# Patient Record
Sex: FEMALE
[De-identification: names, ages, dates, MRNs, and addresses within clinical notes are randomized per-mention and may not be internally consistent; named-entity substitution may affect disease eponyms.]

---

## 2017-04-06 ENCOUNTER — APPOINTMENT (OUTPATIENT)
Dept: PEDIATRICS | Facility: CLINIC | Age: 32
End: 2017-04-06

## 2017-04-06 DIAGNOSIS — Z83.2 FAMILY HISTORY OF DISEASES OF THE BLOOD AND BLOOD-FORMING ORGANS AND CERTAIN DISORDERS INVOLVING THE IMMUNE MECHANISM: ICD-10-CM

## 2017-04-06 DIAGNOSIS — Z82.0 FAMILY HISTORY OF EPILEPSY AND OTHER DISEASES OF THE NERVOUS SYSTEM: ICD-10-CM

## 2017-04-06 DIAGNOSIS — Z82.69 FAMILY HISTORY OF OTHER DISEASES OF THE MUSCULOSKELETAL SYSTEM AND CONNECTIVE TISSUE: ICD-10-CM

## 2017-04-06 PROBLEM — Z00.00 ENCOUNTER FOR PREVENTIVE HEALTH EXAMINATION: Status: ACTIVE | Noted: 2017-04-06

## 2017-04-10 PROBLEM — Z82.69 FAMILY HISTORY OF SCOLIOSIS: Status: ACTIVE | Noted: 2017-04-10

## 2017-04-10 PROBLEM — Z82.0 FAMILY HISTORY OF MIGRAINE HEADACHES: Status: ACTIVE | Noted: 2017-04-10

## 2017-04-10 PROBLEM — Z83.2 FAMILY HISTORY OF BLEEDING OR CLOTTING DISORDER: Status: ACTIVE | Noted: 2017-04-10

## 2017-05-17 ENCOUNTER — APPOINTMENT (OUTPATIENT)
Dept: PEDIATRIC HEMATOLOGY/ONCOLOGY | Facility: CLINIC | Age: 32
End: 2017-05-17

## 2017-05-17 DIAGNOSIS — Z33.1 PREGNANT STATE, INCIDENTAL: ICD-10-CM

## 2017-06-12 ENCOUNTER — APPOINTMENT (OUTPATIENT)
Dept: PEDIATRIC HEMATOLOGY/ONCOLOGY | Facility: CLINIC | Age: 32
End: 2017-06-12

## 2018-01-08 ENCOUNTER — APPOINTMENT (OUTPATIENT)
Dept: HEART AND VASCULAR | Facility: CLINIC | Age: 33
End: 2018-01-08
Payer: MEDICAID

## 2018-01-08 ENCOUNTER — APPOINTMENT (OUTPATIENT)
Dept: PEDIATRIC HEMATOLOGY/ONCOLOGY | Facility: CLINIC | Age: 33
End: 2018-01-08
Payer: MEDICAID

## 2018-01-08 DIAGNOSIS — R93.8 ABNORMAL FINDINGS ON DIAGNOSTIC IMAGING OF OTHER SPECIFIED BODY STRUCTURES: ICD-10-CM

## 2018-01-08 PROCEDURE — 99215 OFFICE O/P EST HI 40 MIN: CPT

## 2018-01-08 PROCEDURE — 99204 OFFICE O/P NEW MOD 45 MIN: CPT

## 2018-01-09 RX ORDER — VITAMIN A, ASCORBIC ACID, CHOLECALCIFEROL, TOCOPHEROL, THIAMINE MONONITRATE, RIBOFLAVIN, PYRIDOXINE, FOLIC ACID, CYANOCOBALAMIN, CALCIUM CARBONATE, FERROUS FUMARATE, ZINC OXIDE, CUPRIC OXIDE, NIACINAMIDE, AND FISH OIL 27-1-250MG
KIT ORAL
Refills: 0 | Status: ACTIVE | COMMUNITY

## 2018-02-16 VITALS
DIASTOLIC BLOOD PRESSURE: 77 MMHG | HEIGHT: 66 IN | TEMPERATURE: 98 F | OXYGEN SATURATION: 96 % | HEART RATE: 75 BPM | WEIGHT: 154.98 LBS | RESPIRATION RATE: 18 BRPM | SYSTOLIC BLOOD PRESSURE: 118 MMHG

## 2018-02-16 RX ORDER — CHLORHEXIDINE GLUCONATE 213 G/1000ML
1 SOLUTION TOPICAL ONCE
Qty: 0 | Refills: 0 | Status: DISCONTINUED | OUTPATIENT
Start: 2018-02-20 | End: 2018-02-21

## 2018-02-16 NOTE — H&P ADULT - HISTORY OF PRESENT ILLNESS
**SKELETON    32 yr old F with PMHx of hereditary hemorrhagic telangiectasia syndrome (diagnosed during her recent pregnancy, patient son now 3 months old), pulmonary AVMs, brain AVM who presented to Dr. Gomez for evaluation. Patient does admit to severe migraines since adolescence and they have became much more frequent and severe since her pregnancy. Patient states her migraines are preceded by SOB. Patient denies any history of epistaxis or other bleeding abnormalities. MRI of chest demonstrated at least 5 pulmonary AVMs in both lungs. The largest is in the left lower lobe and is quite significant measuring 3 x 1.9cm. (as per Dr. Gomez's note). Multiple pulmonary function tests have been performed by pulmonologist Dr. Cruz and her oxygenation has been shown to be low during exertion in the studies.     Patient now presents for recommended pulmonary angiogram/embolization under general anesthesia. 32 yr old F with PMHx of hereditary hemorrhagic telangiectasia syndrome (diagnosed during her recent pregnancy, patient son now 4 months old), pulmonary AVMs, brain AVM (0.5cm) who presented to Dr. Gomez for evaluation. Patient does admit to severe migraines since adolescence and they have became much more frequent and severe since her pregnancy. Patient states her migraines are preceded by SOB. Patient denies any history of epistaxis or other bleeding abnormalities. MRI of chest 1/2018 demonstrated at least 5 pulmonary AVMs in both lungs. The largest is in the left lower lobe and is quite significant measuring 3 x 1.9cm. (as per Dr. Gomez's note). Multiple pulmonary function tests have been performed by pulmonologist Dr. Cruz and her oxygenation has been shown to be low during exertion in the studies.     Patient now presents for recommended pulmonary angiogram/embolization under general anesthesia. 32 yr old F with PMHx of hereditary hemorrhagic telangiectasia syndrome (diagnosed during her recent pregnancy, patient son now 4 months old), pulmonary AVMs, brain AVM (0.5cm) who presented to Dr. Gomez for evaluation. Patient does admit to severe migraines since adolescence and they have became much more frequent and severe since her pregnancy. Patient states her migraines are preceded by SOB. Patient denies any history of epistaxis or other bleeding abnormalities. MRI of chest 1/2018 demonstrated at least 5 pulmonary AVMs in both lungs. The largest is in the left lower lobe and is quite significant measuring 3 x 1.9cm. (as per Dr. Gomez's note). Multiple pulmonary function tests have been performed by pulmonologist Dr. Cruz and her oxygenation has been shown to be low during exertion in the studies.     Patient now presents for recommended pulmonary angiogram/embolization under general anesthesia.    **Of note: Patient would like her father Dr. Jhonatan Garcia to be contacted in case of any emergencies, [phone].

## 2018-02-16 NOTE — H&P ADULT - ASSESSMENT
32 yr old F with PMHx of hereditary hemorrhagic telangiectasia syndrome, pulmonary AVMs, who now presents for recommended pulmonary angiogram/embolization under general anesthesia.      ASA III        Mallampati II       Risks & benefits of procedure and alternative therapy have been explained to the patient including but not limited to: allergic reaction, bleeding w/possible need for blood transfusion, infection, renal and vascular compromise, limb damage, emergent surgery. Informed consent obtained and in chart.

## 2018-02-20 ENCOUNTER — INPATIENT (INPATIENT)
Facility: HOSPITAL | Age: 33
LOS: 0 days | Discharge: HOME | DRG: 254 | End: 2018-02-21
Attending: RADIOLOGY | Admitting: RADIOLOGY
Payer: MEDICAID

## 2018-02-20 DIAGNOSIS — Q27.30 ARTERIOVENOUS MALFORMATION, SITE UNSPECIFIED: ICD-10-CM

## 2018-02-20 LAB
ANION GAP SERPL CALC-SCNC: 14 MMOL/L — SIGNIFICANT CHANGE UP (ref 5–17)
APTT BLD: 29.6 SEC — SIGNIFICANT CHANGE UP (ref 27.5–37.4)
BASOPHILS NFR BLD AUTO: 0.6 % — SIGNIFICANT CHANGE UP (ref 0–2)
BUN SERPL-MCNC: 10 MG/DL — SIGNIFICANT CHANGE UP (ref 7–23)
CALCIUM SERPL-MCNC: 9.4 MG/DL — SIGNIFICANT CHANGE UP (ref 8.4–10.5)
CHLORIDE SERPL-SCNC: 101 MMOL/L — SIGNIFICANT CHANGE UP (ref 96–108)
CO2 SERPL-SCNC: 24 MMOL/L — SIGNIFICANT CHANGE UP (ref 22–31)
CREAT SERPL-MCNC: 0.56 MG/DL — SIGNIFICANT CHANGE UP (ref 0.5–1.3)
EOSINOPHIL NFR BLD AUTO: 1.7 % — SIGNIFICANT CHANGE UP (ref 0–6)
GLUCOSE SERPL-MCNC: 92 MG/DL — SIGNIFICANT CHANGE UP (ref 70–99)
HCG SERPL-ACNC: <.1 MIU/ML — SIGNIFICANT CHANGE UP
HCT VFR BLD CALC: 42.9 % — SIGNIFICANT CHANGE UP (ref 34.5–45)
HGB BLD-MCNC: 14.7 G/DL — SIGNIFICANT CHANGE UP (ref 11.5–15.5)
INR BLD: 1.19 — HIGH (ref 0.88–1.16)
LYMPHOCYTES # BLD AUTO: 40.3 % — SIGNIFICANT CHANGE UP (ref 13–44)
MCHC RBC-ENTMCNC: 29.9 PG — SIGNIFICANT CHANGE UP (ref 27–34)
MCHC RBC-ENTMCNC: 34.3 G/DL — SIGNIFICANT CHANGE UP (ref 32–36)
MCV RBC AUTO: 87.2 FL — SIGNIFICANT CHANGE UP (ref 80–100)
MONOCYTES NFR BLD AUTO: 7.2 % — SIGNIFICANT CHANGE UP (ref 2–14)
NEUTROPHILS NFR BLD AUTO: 50.2 % — SIGNIFICANT CHANGE UP (ref 43–77)
PLATELET # BLD AUTO: 179 K/UL — SIGNIFICANT CHANGE UP (ref 150–400)
POTASSIUM SERPL-MCNC: 4.2 MMOL/L — SIGNIFICANT CHANGE UP (ref 3.5–5.3)
POTASSIUM SERPL-SCNC: 4.2 MMOL/L — SIGNIFICANT CHANGE UP (ref 3.5–5.3)
PROTHROM AB SERPL-ACNC: 13.3 SEC — HIGH (ref 9.8–12.7)
RBC # BLD: 4.92 M/UL — SIGNIFICANT CHANGE UP (ref 3.8–5.2)
RBC # FLD: 13.8 % — SIGNIFICANT CHANGE UP (ref 10.3–16.9)
SODIUM SERPL-SCNC: 139 MMOL/L — SIGNIFICANT CHANGE UP (ref 135–145)
WBC # BLD: 5.4 K/UL — SIGNIFICANT CHANGE UP (ref 3.8–10.5)
WBC # FLD AUTO: 5.4 K/UL — SIGNIFICANT CHANGE UP (ref 3.8–10.5)

## 2018-02-20 PROCEDURE — 93010 ELECTROCARDIOGRAM REPORT: CPT

## 2018-02-20 PROCEDURE — 75743 ARTERY X-RAYS LUNGS: CPT | Mod: 26

## 2018-02-20 PROCEDURE — 36014 PLACE CATHETER IN ARTERY: CPT

## 2018-02-20 PROCEDURE — 75741 ARTERY X-RAYS LUNG: CPT | Mod: 26,59

## 2018-02-20 PROCEDURE — 36013 PLACE CATHETER IN ARTERY: CPT | Mod: 59

## 2018-02-20 RX ORDER — SODIUM CHLORIDE 9 MG/ML
1000 INJECTION INTRAMUSCULAR; INTRAVENOUS; SUBCUTANEOUS
Qty: 0 | Refills: 0 | Status: DISCONTINUED | OUTPATIENT
Start: 2018-02-20 | End: 2018-02-21

## 2018-02-20 RX ORDER — ONDANSETRON 8 MG/1
4 TABLET, FILM COATED ORAL EVERY 4 HOURS
Qty: 0 | Refills: 0 | Status: DISCONTINUED | OUTPATIENT
Start: 2018-02-20 | End: 2018-02-21

## 2018-02-20 RX ORDER — MORPHINE SULFATE 50 MG/1
4 CAPSULE, EXTENDED RELEASE ORAL EVERY 4 HOURS
Qty: 0 | Refills: 0 | Status: DISCONTINUED | OUTPATIENT
Start: 2018-02-20 | End: 2018-02-21

## 2018-02-20 RX ORDER — SODIUM CHLORIDE 9 MG/ML
500 INJECTION INTRAMUSCULAR; INTRAVENOUS; SUBCUTANEOUS
Qty: 0 | Refills: 0 | Status: DISCONTINUED | OUTPATIENT
Start: 2018-02-20 | End: 2018-02-21

## 2018-02-20 RX ORDER — CEFAZOLIN SODIUM 1 G
1000 VIAL (EA) INJECTION EVERY 8 HOURS
Qty: 0 | Refills: 0 | Status: DISCONTINUED | OUTPATIENT
Start: 2018-02-20 | End: 2018-02-21

## 2018-02-20 RX ADMIN — SODIUM CHLORIDE 75 MILLILITER(S): 9 INJECTION INTRAMUSCULAR; INTRAVENOUS; SUBCUTANEOUS at 22:47

## 2018-02-20 RX ADMIN — Medication 100 MILLIGRAM(S): at 22:47

## 2018-02-21 ENCOUNTER — TRANSCRIPTION ENCOUNTER (OUTPATIENT)
Age: 33
End: 2018-02-21

## 2018-02-21 VITALS — TEMPERATURE: 97 F

## 2018-02-21 LAB
ANION GAP SERPL CALC-SCNC: 12 MMOL/L — SIGNIFICANT CHANGE UP (ref 5–17)
BUN SERPL-MCNC: 11 MG/DL — SIGNIFICANT CHANGE UP (ref 7–23)
CALCIUM SERPL-MCNC: 9.3 MG/DL — SIGNIFICANT CHANGE UP (ref 8.4–10.5)
CHLORIDE SERPL-SCNC: 105 MMOL/L — SIGNIFICANT CHANGE UP (ref 96–108)
CO2 SERPL-SCNC: 23 MMOL/L — SIGNIFICANT CHANGE UP (ref 22–31)
CREAT SERPL-MCNC: 0.62 MG/DL — SIGNIFICANT CHANGE UP (ref 0.5–1.3)
GLUCOSE SERPL-MCNC: 100 MG/DL — HIGH (ref 70–99)
HCT VFR BLD CALC: 41 % — SIGNIFICANT CHANGE UP (ref 34.5–45)
HGB BLD-MCNC: 13.7 G/DL — SIGNIFICANT CHANGE UP (ref 11.5–15.5)
MCHC RBC-ENTMCNC: 29.1 PG — SIGNIFICANT CHANGE UP (ref 27–34)
MCHC RBC-ENTMCNC: 33.4 G/DL — SIGNIFICANT CHANGE UP (ref 32–36)
MCV RBC AUTO: 87 FL — SIGNIFICANT CHANGE UP (ref 80–100)
PLATELET # BLD AUTO: 172 K/UL — SIGNIFICANT CHANGE UP (ref 150–400)
POTASSIUM SERPL-MCNC: 4.4 MMOL/L — SIGNIFICANT CHANGE UP (ref 3.5–5.3)
POTASSIUM SERPL-SCNC: 4.4 MMOL/L — SIGNIFICANT CHANGE UP (ref 3.5–5.3)
RBC # BLD: 4.71 M/UL — SIGNIFICANT CHANGE UP (ref 3.8–5.2)
RBC # FLD: 13.7 % — SIGNIFICANT CHANGE UP (ref 10.3–16.9)
SODIUM SERPL-SCNC: 140 MMOL/L — SIGNIFICANT CHANGE UP (ref 135–145)
WBC # BLD: 7.9 K/UL — SIGNIFICANT CHANGE UP (ref 3.8–10.5)
WBC # FLD AUTO: 7.9 K/UL — SIGNIFICANT CHANGE UP (ref 3.8–10.5)

## 2018-02-21 RX ORDER — CEPHALEXIN 500 MG
1 CAPSULE ORAL
Qty: 28 | Refills: 0
Start: 2018-02-21 | End: 2018-02-27

## 2018-02-21 RX ADMIN — Medication 100 MILLIGRAM(S): at 06:23

## 2018-02-21 NOTE — DISCHARGE NOTE ADULT - ADDITIONAL INSTRUCTIONS
All of your medications have been prescribed to your pharmacy and please take as directed, do not stop unless directed by physician.  Please follow up with Dr. Gomez in 6 weeks.

## 2018-02-21 NOTE — DISCHARGE NOTE ADULT - CARE PROVIDER_API CALL
David Gomez (MD), Diagnostic Radiology  [address]  [address] [place] [address]  Phone: [phone]  Fax: [phone]

## 2018-02-21 NOTE — DISCHARGE NOTE ADULT - MEDICATION SUMMARY - MEDICATIONS TO TAKE
I will START or STAY ON the medications listed below when I get home from the hospital:    Keflex 500 mg oral capsule  -- 1 cap(s) by mouth 4 times a day  -- Indication: For Prophylaxis for infection

## 2018-02-21 NOTE — DISCHARGE NOTE ADULT - PLAN OF CARE
You had a coil embolization of left pulmonary AVM You had a coil embolization of left pulmonary AVM. Please take antibiotics as prescribed and follow up with Dr. Gomez in 6 weeks - started on Keflex 500mg four times a day orally for one week  - follow up with Dr. Gomez in 6 weeks

## 2018-02-21 NOTE — DISCHARGE NOTE ADULT - INSTRUCTIONS
You underwent a coil emobolization of left pulmonary AVM and should wait 3 days before returning to ordinary activities. Do not drive for 2 days. Consult your doctor before returning to vigorous activity. You may return to work in 3-5 days. The catheter from your groin was removed and you should remove the dressing in 24 hours. You may shower once the dressing is removed, but avoid baths, hot tubs, or swimming for 5 days to prevent infection. If you notice bleeding from the site, hardening and pain at the site, drainage or redness from the site, coolness/paleness of the extremity, swelling, or fever, please call [phone]. Please continue your medications as prescribed unless otherwise indicated by your physician. All of your prescriptions have been sent to the pharmacy. Please follow up with Dr. Gomez in 6 weeks. All of your needed prescriptions have been sent

## 2018-02-21 NOTE — DISCHARGE NOTE ADULT - CARE PLAN
Principal Discharge DX:	AVM (arteriovenous malformation) Principal Discharge DX:	AVM (arteriovenous malformation)  Goal:	You had a coil embolization of left pulmonary AVM Principal Discharge DX:	AVM (arteriovenous malformation)  Goal:	You had a coil embolization of left pulmonary AVM. Please take antibiotics as prescribed and follow up with Dr. Gomez in 6 weeks  Assessment and plan of treatment:	- started on Keflex 500mg four times a day orally for one week  - follow up with Dr. Gomez in 6 weeks

## 2018-02-21 NOTE — DISCHARGE NOTE ADULT - PATIENT PORTAL LINK FT
You can access the [facility], offered by Health system, by registering with the following website: [email or web address]

## 2018-02-21 NOTE — DISCHARGE NOTE ADULT - HOSPITAL COURSE
32 yr old F with PMHx of hereditary hemorrhagic telangiectasia syndrome (diagnosed during her recent pregnancy, patient son now 4 months old), pulmonary AVMs, brain AVM (0.5cm) who presented to Dr. Gomez for evaluation. Patient does admit to severe migraines since adolescence and they have became much more frequent and severe since her pregnancy. Patient states her migraines are preceded by SOB. Patient denies any history of epistaxis or other bleeding abnormalities. MRI of chest 1/2018 demonstrated at least 5 pulmonary AVMs in both lungs. The largest is in the left lower lobe and is quite significant measuring 3 x 1.9cm. (as per Dr. Gomez's note). Multiple pulmonary function tests have been performed by pulmonologist Dr. Cruz and her oxygenation has been shown to be low during exertion in the studies. Patient now presents for recommended pulmonary angiogram/embolization under general anesthesia. Of note: Patient would like her father Dr. Jhonatan Garcia to be contacted in case of any emergencies, [phone]. 32 yr old F with PMHx of hereditary hemorrhagic telangiectasia syndrome (diagnosed during her recent pregnancy, patient son now 4 months old), pulmonary AVMs, brain AVM (0.5cm) who presented to Dr. Gomez for evaluation. Patient does admit to severe migraines since adolescence and they have became much more frequent and severe since her pregnancy. Patient states her migraines are preceded by SOB. Patient denies any history of epistaxis or other bleeding abnormalities. MRI of chest 1/2018 demonstrated at least 5 pulmonary AVMs in both lungs. The largest is in the left lower lobe and is quite significant measuring 3 x 1.9cm. (as per Dr. Gomez's note). Multiple pulmonary function tests have been performed by pulmonologist Dr. Cruz and her oxygenation has been shown to be low during exertion in the studies. Patient now presents for recommended pulmonary angiogram/embolization under general anesthesia. Of note: Patient would like her father Dr. Jhonatan Garcia to be contacted in case of any emergencies, [phone].     Pt is s/p coil embolization of Left  pulmonary AVM 02/20. Overnight, no events. This AM, feels well, denies pain at access site, CP, SOB, n/v/d, LE edema. VSS, no events on tele, labs unremarkable PE sig for R groin site stable c/d/i, no bleeding, swelling, hematoma. Patient was seen and examined by attending who agrees with above d/c plan. All meds rx to pharmacy and explained to patient. Patient instructed to return if sx worsen including not limited to chest pain, shortness of breath. 32 yr old F with PMHx of hereditary hemorrhagic telangiectasia syndrome (diagnosed during her recent pregnancy, patient son now 4 months old), pulmonary AVMs, brain AVM (0.5cm) who presented to Dr. Gomez for evaluation. Patient does admit to severe migraines since adolescence and they have became much more frequent and severe since her pregnancy. Patient states her migraines are preceded by SOB. Patient denies any history of epistaxis or other bleeding abnormalities. MRI of chest 1/2018 demonstrated at least 5 pulmonary AVMs in both lungs. The largest is in the left lower lobe and is quite significant measuring 3 x 1.9cm. (as per Dr. Gomez's note). Multiple pulmonary function tests have been performed by pulmonologist Dr. Cruz and her oxygenation has been shown to be low during exertion in the studies. Patient now presents for recommended pulmonary angiogram/embolization under general anesthesia. Of note: Patient would like her father Dr. Jhonatan Garcia to be contacted in case of any emergencies, [phone].     Pt is s/p coil embolization of Left  pulmonary AVM 02/20. Overnight, no events. This AM, feels well, denies pain at access site, CP, SOB, n/v/d, LE edema. VSS, no events on tele, labs unremarkable PE sig for R groin site stable c/d/i, no bleeding, swelling or hematoma. Patient was seen and examined by attending who agrees with above d/c plan. All meds rx to pharmacy and explained to patient. Patient instructed to return if sx worsen including not limited to chest pain, shortness of breath.

## 2018-03-05 DIAGNOSIS — Q25.72 CONGENITAL PULMONARY ARTERIOVENOUS MALFORMATION: ICD-10-CM

## 2018-03-05 DIAGNOSIS — I78.0 HEREDITARY HEMORRHAGIC TELANGIECTASIA: ICD-10-CM

## 2018-03-05 DIAGNOSIS — G43.909 MIGRAINE, UNSPECIFIED, NOT INTRACTABLE, WITHOUT STATUS MIGRAINOSUS: ICD-10-CM

## 2018-04-03 PROBLEM — Q27.30 ARTERIOVENOUS MALFORMATION, SITE UNSPECIFIED: Chronic | Status: ACTIVE | Noted: 2018-02-20

## 2018-04-09 ENCOUNTER — APPOINTMENT (OUTPATIENT)
Dept: HEART AND VASCULAR | Facility: CLINIC | Age: 33
End: 2018-04-09

## 2018-04-23 ENCOUNTER — APPOINTMENT (OUTPATIENT)
Dept: HEART AND VASCULAR | Facility: CLINIC | Age: 33
End: 2018-04-23
Payer: MEDICAID

## 2018-04-23 PROCEDURE — 99214 OFFICE O/P EST MOD 30 MIN: CPT

## 2018-05-23 PROCEDURE — 84702 CHORIONIC GONADOTROPIN TEST: CPT

## 2018-05-23 PROCEDURE — 93005 ELECTROCARDIOGRAM TRACING: CPT

## 2018-05-23 PROCEDURE — C1769: CPT

## 2018-05-23 PROCEDURE — C1889: CPT

## 2018-05-23 PROCEDURE — C1894: CPT

## 2018-05-23 PROCEDURE — 80048 BASIC METABOLIC PNL TOTAL CA: CPT

## 2018-05-23 PROCEDURE — C1887: CPT

## 2018-05-23 PROCEDURE — 85730 THROMBOPLASTIN TIME PARTIAL: CPT

## 2018-05-23 PROCEDURE — 85610 PROTHROMBIN TIME: CPT

## 2018-05-23 PROCEDURE — 85027 COMPLETE CBC AUTOMATED: CPT

## 2018-05-23 PROCEDURE — 85025 COMPLETE CBC W/AUTO DIFF WBC: CPT

## 2019-07-01 NOTE — PATIENT PROFILE ADULT. - TEACHING/LEARNING FACTORS IMPACT ABILITY TO LEARN
CONSTITUTIONAL: No fevers, no chills  Eyes: No vision changes  Cardiovascular: No Chest pain  Respiratory: No SOB none

## 2019-12-04 ENCOUNTER — APPOINTMENT (OUTPATIENT)
Dept: PEDIATRIC HEMATOLOGY/ONCOLOGY | Facility: CLINIC | Age: 34
End: 2019-12-04
Payer: MEDICAID

## 2019-12-04 VITALS — HEART RATE: 100 BPM

## 2019-12-04 DIAGNOSIS — Q25.72 CONGENITAL PULMONARY ARTERIOVENOUS MALFORMATION: ICD-10-CM

## 2019-12-04 DIAGNOSIS — I78.0 HEREDITARY HEMORRHAGIC TELANGIECTASIA: ICD-10-CM

## 2019-12-04 PROCEDURE — 99215 OFFICE O/P EST HI 40 MIN: CPT

## 2019-12-05 NOTE — ASSESSMENT
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 MRI/MRA of brain demonstrated small AVM in\par Pulmonary evaluation notable for resting oxygen saturation 97-98% with desaturation after 6 minute walk. MRI of chest scheduled. Son has had spinal AVM which bled, and he has neurologic deficits.  Last seen 01/08/2018. s/p embolization and follow-up angiogram by Dr. Damon for CNS AVMs which were small. s/p angiogram and embolization of chest AVM – he embolized the largest lesion and there are 2 small lesions. Still gets migraine like headaches 1-2 times per month with transient vision loss, emesis, photophobia, becomes constipated. Headaches and hard cheese, hot dogs, deli meats, generally trigger the headaches. Treats with cumin tablet. Not followed by a neurologist. No mouth bleeding. Seen by dentist 3-4 months ago. Dentist wants to have patient to take antibiotics prior to each visit – mother and Dr. Roslyn Gomez concerned about frequent use of antibiotics. Followed by Dr. Cruz (pulmonary) – O2 sat in 97% range. No respiratory issues. Uses copper IUD (non-hormonal) for birth control. Here to discuss monitoring plan, headaches, and  some questions about son who also has HHT. Also would like to discuss possibility of IVF pregnancy with pre-implantation genetic testing – risks of hormonal stimulation, etc.\par Medications: none\par Allergies: Penicillin - hives Nutrition: eating well, Kosher Elimination: normal, no gross blood in stool Sleep: normal Pain: none except when she has migraines\par O2 sat = 96=97%\par 					Normal	Abnormal findings and comments\par General appearance			alert, active, in no acute distress\par Mood and affect			cooperative\par Head						normal\par Eyes					wears spectacles\par Ears						normal\par Nose						normal\par Pharynx/buccal mucosa/throat	small pinpoint vascular spots on tongue, cheeks and mucosal portion of gums\par Neck						normal\par Chest					clear R&L, no stridor, rhonchi or wheezing\par Heart					S1S2, no murmur, RRR\par Abdomen					normal\par Extremities			some vascular streaking on fingertips and subungual\par Back					ND\par Skin				see above and photographs\par Neurologic					normal\par Pulses 						normal\par Photograph taken: yes\par Impression/Plan: Patient with Hereditary Hemorrhagic Telangiectasia with 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 Clinically stable. Discussed the following:\par 1. monitoring – abdominal ultrasound, MRI/MRA of chest – will schedule\par 2. Follow-up with Dr. Cruz\par 3. Dental – I will contact my colleagues who follow complex patients with dental issues and get back to mother. Questions to address – is Peridex (or similar) adequate for pre and post dental visits which are non-invasive or are oral antibiotics necessary? Liquid Amicar swish and spit to prevent bleeding – to have available. Any other suggestions.\par 4. I will discuss possible modified IVF protocol that we have used for women having vascular malformations, to limit exposure to hormonal surges.\par 5. Headaches – migraine like – advised to avoid aspirin, NSAIDs which can cause bleeding.\par 6. For son – mother will schedule an appointment for him and I can order MRI/MRA of head and chest, and abdominal ultrasound. Will refer for treatment/management of epistaxis. I will contact Dr. Sands for input re: orthopedist and brace, as the past 2 orthotics have caused irritation and pain.\par All questions answered.  Letter to Dr. Roslyn Gomez. Routine care with pediatrician.\par Follow-up: as above\par History, review of systems, physical examination. Coordination of care and/or counseling >50%. Reviewed prior photographs. Photograph, downloading, cropping, indexing, 10 minutes.\par Evelyn Aldridge MD    Date/Time:       12/4/19 10:15 AM         Signature:       \par [facility] [employer].\par [facility] [place] [facility].  We do participate with [employer].  You can have the family contact the office at .	[email or web address]

## 2019-12-05 NOTE — REASON FOR VISIT
[Follow-Up Visit] : a follow-up visit  [Patient] : patient [FreeTextEntry2] : management of hereditary hemorrhagic telangiectasia and discuss treatment/monitoring plan.

## 2020-01-24 ENCOUNTER — APPOINTMENT (OUTPATIENT)
Dept: HEART AND VASCULAR | Facility: CLINIC | Age: 35
End: 2020-01-24
Payer: MEDICAID

## 2020-01-24 DIAGNOSIS — R51 HEADACHE: ICD-10-CM

## 2020-01-24 DIAGNOSIS — I78.0 HEREDITARY HEMORRHAGIC TELANGIECTASIA: ICD-10-CM

## 2020-01-24 DIAGNOSIS — Q28.2 ARTERIOVENOUS MALFORMATION OF CEREBRAL VESSELS: ICD-10-CM

## 2020-01-24 PROCEDURE — 99214 OFFICE O/P EST MOD 30 MIN: CPT

## 2020-02-03 VITALS
HEART RATE: 86 BPM | RESPIRATION RATE: 16 BRPM | WEIGHT: 160.28 LBS | DIASTOLIC BLOOD PRESSURE: 85 MMHG | HEIGHT: 66 IN | OXYGEN SATURATION: 98 % | TEMPERATURE: 98 F | SYSTOLIC BLOOD PRESSURE: 106 MMHG

## 2020-02-03 RX ORDER — CHLORHEXIDINE GLUCONATE 213 G/1000ML
1 SOLUTION TOPICAL ONCE
Refills: 0 | Status: DISCONTINUED | OUTPATIENT
Start: 2020-02-04 | End: 2020-02-05

## 2020-02-03 NOTE — H&P ADULT - HISTORY OF PRESENT ILLNESS
SKELETON  Hx obtained for outpatient note.     Patient is a 34 year old female FHx of AVM (son spinal AVM with neurological deficits) with Hereditary Hemorrhagic Telangiectasia with c.360+5G>C (Intronic) Endoglin mutation. MRI/MRA of brain demonstrated small AVM in Pulmonary evaluation notable for resting oxygen saturation 97-98% with desaturation after 6 minute walk. MRI of chest scheduled.  Last embolization 01/08/2018. embolized the largest lesion and there are 2 small lesions. Followed by Dr. Cruz (pulmonary) – O2 sat in 97% range. No respiratory issues.     Presents for Pulmonary embolization under general anesthesia Patient is a 34 year old female FHx of AVM (son spinal AVM with neurological deficits) with Hereditary Hemorrhagic Telangiectasia with c.360+5G>C (Intronic) Endoglin mutation presented to her doctor c/o severe migraines and chest tightness when she is carrying something heavy or doing some physical activity.  Pt was referred for MRI/MRA of brain demonstrated small AVM in Pulmonary evaluation notable for resting oxygen saturation 97-98% with desaturation after 6 minute walk. MRA of chest was done, pt brought CD with her.  Last embolization 01/08/2018. embolized the largest lesion and there are 2 small lesions. Followed by Dr. Cruz (pulmonary) – O2 sat in 97% range. No respiratory issues. Pt denies SOB, palpitations, abdominal pain, dizziness, syncope, fever/chills, blood in the stool. Pt was given BB for her headaches but she stopped taking it on her own.     In light of pt's risk factors, symptoms mentioned above and abnormal MRA/MRI of the brain, pt presents for Pulmonary embolization under general anesthesia.

## 2020-02-03 NOTE — H&P ADULT - ASSESSMENT
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    ASA III and Mallampati III    OF NOTE:  pt's LMP on 1/31/2020, BHCG is negative

## 2020-02-04 ENCOUNTER — INPATIENT (INPATIENT)
Facility: HOSPITAL | Age: 35
LOS: 0 days | Discharge: HOME | DRG: 254 | End: 2020-02-05
Attending: RADIOLOGY | Admitting: RADIOLOGY
Payer: COMMERCIAL

## 2020-02-04 PROBLEM — I78.0 HHT (HEREDITARY HEMORRHAGIC TELANGIECTASIA): Status: ACTIVE | Noted: 2018-01-09

## 2020-02-04 PROBLEM — Q28.2 AVM (ARTERIOVENOUS MALFORMATION) BRAIN: Status: ACTIVE | Noted: 2017-05-17

## 2020-02-04 PROBLEM — R51 FREQUENT HEADACHES: Status: ACTIVE | Noted: 2017-04-06

## 2020-02-04 LAB
ALBUMIN SERPL ELPH-MCNC: 4.7 G/DL — SIGNIFICANT CHANGE UP (ref 3.3–5)
ALP SERPL-CCNC: 58 U/L — SIGNIFICANT CHANGE UP (ref 40–120)
ALT FLD-CCNC: 19 U/L — SIGNIFICANT CHANGE UP (ref 10–45)
ANION GAP SERPL CALC-SCNC: 12 MMOL/L — SIGNIFICANT CHANGE UP (ref 5–17)
APTT BLD: 29.9 SEC — SIGNIFICANT CHANGE UP (ref 27.5–36.3)
AST SERPL-CCNC: 20 U/L — SIGNIFICANT CHANGE UP (ref 10–40)
BASOPHILS # BLD AUTO: 0.04 K/UL — SIGNIFICANT CHANGE UP (ref 0–0.2)
BASOPHILS NFR BLD AUTO: 0.8 % — SIGNIFICANT CHANGE UP (ref 0–2)
BILIRUB SERPL-MCNC: 0.5 MG/DL — SIGNIFICANT CHANGE UP (ref 0.2–1.2)
BUN SERPL-MCNC: 12 MG/DL — SIGNIFICANT CHANGE UP (ref 7–23)
CALCIUM SERPL-MCNC: 9.4 MG/DL — SIGNIFICANT CHANGE UP (ref 8.4–10.5)
CHLORIDE SERPL-SCNC: 103 MMOL/L — SIGNIFICANT CHANGE UP (ref 96–108)
CO2 SERPL-SCNC: 26 MMOL/L — SIGNIFICANT CHANGE UP (ref 22–31)
CREAT SERPL-MCNC: 0.63 MG/DL — SIGNIFICANT CHANGE UP (ref 0.5–1.3)
EOSINOPHIL # BLD AUTO: 0.12 K/UL — SIGNIFICANT CHANGE UP (ref 0–0.5)
EOSINOPHIL NFR BLD AUTO: 2.5 % — SIGNIFICANT CHANGE UP (ref 0–6)
GLUCOSE SERPL-MCNC: 94 MG/DL — SIGNIFICANT CHANGE UP (ref 70–99)
HCG SERPL-ACNC: <0 MIU/ML — SIGNIFICANT CHANGE UP
HCT VFR BLD CALC: 46.1 % — HIGH (ref 34.5–45)
HGB BLD-MCNC: 15.2 G/DL — SIGNIFICANT CHANGE UP (ref 11.5–15.5)
IMM GRANULOCYTES NFR BLD AUTO: 0.2 % — SIGNIFICANT CHANGE UP (ref 0–1.5)
INR BLD: 1.2 — HIGH (ref 0.88–1.16)
LYMPHOCYTES # BLD AUTO: 1.53 K/UL — SIGNIFICANT CHANGE UP (ref 1–3.3)
LYMPHOCYTES # BLD AUTO: 32.2 % — SIGNIFICANT CHANGE UP (ref 13–44)
MCHC RBC-ENTMCNC: 30.5 PG — SIGNIFICANT CHANGE UP (ref 27–34)
MCHC RBC-ENTMCNC: 33 GM/DL — SIGNIFICANT CHANGE UP (ref 32–36)
MCV RBC AUTO: 92.4 FL — SIGNIFICANT CHANGE UP (ref 80–100)
MONOCYTES # BLD AUTO: 0.41 K/UL — SIGNIFICANT CHANGE UP (ref 0–0.9)
MONOCYTES NFR BLD AUTO: 8.6 % — SIGNIFICANT CHANGE UP (ref 2–14)
NEUTROPHILS # BLD AUTO: 2.64 K/UL — SIGNIFICANT CHANGE UP (ref 1.8–7.4)
NEUTROPHILS NFR BLD AUTO: 55.7 % — SIGNIFICANT CHANGE UP (ref 43–77)
NRBC # BLD: 0 /100 WBCS — SIGNIFICANT CHANGE UP (ref 0–0)
PLATELET # BLD AUTO: 177 K/UL — SIGNIFICANT CHANGE UP (ref 150–400)
POTASSIUM SERPL-MCNC: 4 MMOL/L — SIGNIFICANT CHANGE UP (ref 3.5–5.3)
POTASSIUM SERPL-SCNC: 4 MMOL/L — SIGNIFICANT CHANGE UP (ref 3.5–5.3)
PROT SERPL-MCNC: 7.3 G/DL — SIGNIFICANT CHANGE UP (ref 6–8.3)
PROTHROM AB SERPL-ACNC: 13.7 SEC — HIGH (ref 10–12.9)
RBC # BLD: 4.99 M/UL — SIGNIFICANT CHANGE UP (ref 3.8–5.2)
RBC # FLD: 12.6 % — SIGNIFICANT CHANGE UP (ref 10.3–14.5)
SODIUM SERPL-SCNC: 141 MMOL/L — SIGNIFICANT CHANGE UP (ref 135–145)
WBC # BLD: 4.75 K/UL — SIGNIFICANT CHANGE UP (ref 3.8–10.5)
WBC # FLD AUTO: 4.75 K/UL — SIGNIFICANT CHANGE UP (ref 3.8–10.5)

## 2020-02-04 PROCEDURE — 75898 FOLLOW-UP ANGIOGRAPHY: CPT | Mod: 26,59

## 2020-02-04 PROCEDURE — 37242 VASC EMBOLIZE/OCCLUDE ARTERY: CPT

## 2020-02-04 PROCEDURE — 36015 PLACE CATHETER IN ARTERY: CPT | Mod: LT

## 2020-02-04 PROCEDURE — 36014 PLACE CATHETER IN ARTERY: CPT | Mod: RT

## 2020-02-04 PROCEDURE — 93010 ELECTROCARDIOGRAM REPORT: CPT

## 2020-02-04 RX ORDER — MORPHINE SULFATE 50 MG/1
4 CAPSULE, EXTENDED RELEASE ORAL EVERY 4 HOURS
Refills: 0 | Status: DISCONTINUED | OUTPATIENT
Start: 2020-02-04 | End: 2020-02-05

## 2020-02-04 RX ORDER — CIPROFLOXACIN LACTATE 400MG/40ML
400 VIAL (ML) INTRAVENOUS EVERY 12 HOURS
Refills: 0 | Status: COMPLETED | OUTPATIENT
Start: 2020-02-04 | End: 2020-02-05

## 2020-02-04 RX ORDER — ONDANSETRON 8 MG/1
4 TABLET, FILM COATED ORAL EVERY 4 HOURS
Refills: 0 | Status: DISCONTINUED | OUTPATIENT
Start: 2020-02-04 | End: 2020-02-05

## 2020-02-04 RX ADMIN — Medication 200 MILLIGRAM(S): at 17:28

## 2020-02-04 NOTE — BRIEF OPERATIVE NOTE - NSICDXBRIEFPROCEDURE_GEN_ALL_CORE_FT
PROCEDURES:  Fluoroscopic guidance for percutaneous embolization of pulmonary artery 04-Feb-2020 10:41:06  Oneida Zavala

## 2020-02-04 NOTE — BRIEF OPERATIVE NOTE - OPERATION/FINDINGS
-L pulmonary angiogram shows residual flow through previously coiled BRENDA AVM  -further coil embolization of pulmonary AVM performed using two 3x4 Concerto coils

## 2020-02-04 NOTE — ASSESSMENT
[FreeTextEntry1] : This is a 34-year-old female with known multiple pulmonary AVMs status post embolization of a large lesion in the mid left lung 2 years ago. She was basically asymptomatic although she does have a history of migraines which may or may not be related to the AVMs. She does have known HHT and is also followed by Dr. Damon. She came with a new MRI which shows a significant sized AVM in the mid left lung. It is unclear whether this is a new lesion or whether there is a recanalization through the previously coiled lesion. There also multiple small pulmonary AVMs in both lungs. She will arrange to come in for pulmonary angiography and embolization of any significant lesions.

## 2020-02-04 NOTE — PHYSICAL EXAM
[Alert] : alert [No Acute Distress] : no acute distress [PERRL] : pupils equal, round and reactive to light [Normal Hearing] : hearing was normal [No Neck Mass] : no neck mass was observed [No Respiratory Distress] : no respiratory distress [Normal Rate] : heart rate was normal  [Regular Rhythm] : with a regular rhythm [No Edema] : there was no peripheral edema [Not Tender] : non-tender [Not Distended] : not distended [Normal Gait] : normal gait [No Rash] : no rash [No Skin Lesions] : no skin lesions [No Motor Deficits] : the motor exam was normal [Oriented x3] : oriented to person, place, and time

## 2020-02-05 ENCOUNTER — TRANSCRIPTION ENCOUNTER (OUTPATIENT)
Age: 35
End: 2020-02-05

## 2020-02-05 VITALS — TEMPERATURE: 98 F

## 2020-02-05 LAB
ANION GAP SERPL CALC-SCNC: 10 MMOL/L — SIGNIFICANT CHANGE UP (ref 5–17)
BUN SERPL-MCNC: 14 MG/DL — SIGNIFICANT CHANGE UP (ref 7–23)
CALCIUM SERPL-MCNC: 9 MG/DL — SIGNIFICANT CHANGE UP (ref 8.4–10.5)
CHLORIDE SERPL-SCNC: 105 MMOL/L — SIGNIFICANT CHANGE UP (ref 96–108)
CO2 SERPL-SCNC: 24 MMOL/L — SIGNIFICANT CHANGE UP (ref 22–31)
CREAT SERPL-MCNC: 0.67 MG/DL — SIGNIFICANT CHANGE UP (ref 0.5–1.3)
GLUCOSE SERPL-MCNC: 124 MG/DL — HIGH (ref 70–99)
HCT VFR BLD CALC: 40.8 % — SIGNIFICANT CHANGE UP (ref 34.5–45)
HGB BLD-MCNC: 13.6 G/DL — SIGNIFICANT CHANGE UP (ref 11.5–15.5)
MCHC RBC-ENTMCNC: 30.5 PG — SIGNIFICANT CHANGE UP (ref 27–34)
MCHC RBC-ENTMCNC: 33.3 GM/DL — SIGNIFICANT CHANGE UP (ref 32–36)
MCV RBC AUTO: 91.5 FL — SIGNIFICANT CHANGE UP (ref 80–100)
NRBC # BLD: 0 /100 WBCS — SIGNIFICANT CHANGE UP (ref 0–0)
PLATELET # BLD AUTO: 190 K/UL — SIGNIFICANT CHANGE UP (ref 150–400)
POTASSIUM SERPL-MCNC: 4.2 MMOL/L — SIGNIFICANT CHANGE UP (ref 3.5–5.3)
POTASSIUM SERPL-SCNC: 4.2 MMOL/L — SIGNIFICANT CHANGE UP (ref 3.5–5.3)
RBC # BLD: 4.46 M/UL — SIGNIFICANT CHANGE UP (ref 3.8–5.2)
RBC # FLD: 12.7 % — SIGNIFICANT CHANGE UP (ref 10.3–14.5)
SODIUM SERPL-SCNC: 139 MMOL/L — SIGNIFICANT CHANGE UP (ref 135–145)
WBC # BLD: 10.99 K/UL — HIGH (ref 3.8–10.5)
WBC # FLD AUTO: 10.99 K/UL — HIGH (ref 3.8–10.5)

## 2020-02-05 PROCEDURE — 99238 HOSP IP/OBS DSCHRG MGMT 30/<: CPT

## 2020-02-05 RX ORDER — MOXIFLOXACIN HYDROCHLORIDE TABLETS, 400 MG 400 MG/1
1 TABLET, FILM COATED ORAL
Qty: 14 | Refills: 0
Start: 2020-02-05 | End: 2020-02-11

## 2020-02-05 RX ADMIN — Medication 200 MILLIGRAM(S): at 06:24

## 2020-02-05 NOTE — DISCHARGE NOTE PROVIDER - HOSPITAL COURSE
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  Pt was referred for MRI/MRA of brain demonstrated small AVM in Pulmonary evaluation notable for resting oxygen saturation 97-98% with desaturation after 6 minute walk. MRA of chest was done, pt brought CD with her.  Last embolization 01/08/2018. embolized the largest lesion and there are 2 small lesions. Followed by Dr. Cruz (pulmonary) – O2 sat in 97% range. No respiratory issues. Pt denies SOB, palpitations, abdominal pain, dizziness, syncope, fever/chills, blood in the stool.  In light of pt's risk factors, symptoms mentioned above and abnormal MRA/MRI of the brain, pt presents for Pulmonary embolization under general anesthesia.  She is now s/p procedure 2/4 receiving coil embolization of L pulmonary AVM.  Right femoral artery sheath removed and access site stable.  Labs, Vitals, Meds reviewed with dr. Gomez and patient deemed stable for discharge home. 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  Pt was referred for MRI/MRA of brain demonstrated small AVM in Pulmonary evaluation notable for resting oxygen saturation 97-98% with desaturation after 6 minute walk. MRA of chest was done, pt brought CD with her.  Last embolization 01/08/2018. embolized the largest lesion and there are 2 small lesions. Followed by Dr. Cruz (pulmonary) – O2 sat in 97% range. No respiratory issues. Pt denies SOB, palpitations, abdominal pain, dizziness, syncope, fever/chills, blood in the stool.  In light of pt's risk factors, symptoms mentioned above and abnormal MRA/MRI of the brain, pt presents for Pulmonary embolization under general anesthesia.  She is now s/p procedure 2/4 receiving coil embolization of L pulmonary AVM.  Right femoral artery sheath removed and access site stable.  Labs, Vitals, Meds reviewed with dr. Gomez and patient deemed stable for discharge home.  She is E prescribed Cipro x7 days for discharge.

## 2020-02-05 NOTE — DISCHARGE NOTE NURSING/CASE MANAGEMENT/SOCIAL WORK - PATIENT PORTAL LINK FT
You can access the FollowMyHealth Patient Portal offered by [facility] by registering at the following website: [email or web address]. By joining [employer]’s FollowMyHealth portal, you will also be able to view your health information using other applications (apps) compatible with our system.

## 2020-02-05 NOTE — DISCHARGE NOTE PROVIDER - CARE PROVIDER_API CALL
David Gomez)  Diagnostic Radiology  [address] [place] [address]  Phone: [phone]  Fax: [phone]  Follow Up Time:

## 2020-02-05 NOTE — DISCHARGE NOTE PROVIDER - NSDCCPCAREPLAN_GEN_ALL_CORE_FT
PRINCIPAL DISCHARGE DIAGNOSIS  Diagnosis: AVM (arteriovenous malformation)  Assessment and Plan of Treatment: - Please follow up with Dr. Gomez   - You underwent coil embolization to the pulmonary AVM on 2/4.   You underwent a  angiogram and should wait 3 days before returning to ordinary activities. Do not drive for 2 days. Consult your doctor before returning to vigorous activity. You may return to work in 3-5 days. The catheter from your groin was removed and you should remove the dressing in 24 hours. You may shower once the dressing is removed, but avoid baths, hot tubs, or swimming for 5 days to prevent infection. If you notice bleeding from the site, hardening and pain at the site, drainage or redness from the site, coolness/paleness of the extremity, swelling, or fever, please call [phone]. PRINCIPAL DISCHARGE DIAGNOSIS  Diagnosis: AVM (arteriovenous malformation)  Assessment and Plan of Treatment: - Please follow up with Dr. Gomez in 6 weeks  - You underwent coil embolization to the pulmonary AVM on 2/4.   - Please take Cipro 500mg Twice daily for 7 days for bacterial prophylaxis.   You underwent a  angiogram and should wait 3 days before returning to ordinary activities. Do not drive for 2 days. Consult your doctor before returning to vigorous activity. You may return to work in 3-5 days. The catheter from your groin was removed and you should remove the dressing in 24 hours. You may shower once the dressing is removed, but avoid baths, hot tubs, or swimming for 5 days to prevent infection. If you notice bleeding from the site, hardening and pain at the site, drainage or redness from the site, coolness/paleness of the extremity, swelling, or fever, please call [phone].

## 2020-02-18 DIAGNOSIS — Q25.72 CONGENITAL PULMONARY ARTERIOVENOUS MALFORMATION: ICD-10-CM

## 2020-02-18 DIAGNOSIS — Q27.30 ARTERIOVENOUS MALFORMATION, SITE UNSPECIFIED: ICD-10-CM

## 2020-02-18 DIAGNOSIS — I78.0 HEREDITARY HEMORRHAGIC TELANGIECTASIA: ICD-10-CM

## 2020-03-01 ENCOUNTER — OUTPATIENT (OUTPATIENT)
Dept: OUTPATIENT SERVICES | Facility: HOSPITAL | Age: 35
LOS: 1 days | End: 2020-03-01
Payer: MEDICAID

## 2020-03-01 PROCEDURE — G9001: CPT

## 2020-03-21 PROCEDURE — C1769: CPT

## 2020-03-21 PROCEDURE — 85730 THROMBOPLASTIN TIME PARTIAL: CPT

## 2020-03-21 PROCEDURE — 80048 BASIC METABOLIC PNL TOTAL CA: CPT

## 2020-03-21 PROCEDURE — 85025 COMPLETE CBC W/AUTO DIFF WBC: CPT

## 2020-03-21 PROCEDURE — C1894: CPT

## 2020-03-21 PROCEDURE — 80053 COMPREHEN METABOLIC PANEL: CPT

## 2020-03-21 PROCEDURE — 85027 COMPLETE CBC AUTOMATED: CPT

## 2020-03-21 PROCEDURE — 85610 PROTHROMBIN TIME: CPT

## 2020-03-21 PROCEDURE — 36415 COLL VENOUS BLD VENIPUNCTURE: CPT

## 2020-03-21 PROCEDURE — C1889: CPT

## 2020-03-21 PROCEDURE — 93005 ELECTROCARDIOGRAM TRACING: CPT

## 2020-03-21 PROCEDURE — 84702 CHORIONIC GONADOTROPIN TEST: CPT

## 2020-03-21 PROCEDURE — C1887: CPT

## 2020-03-23 ENCOUNTER — APPOINTMENT (OUTPATIENT)
Dept: HEART AND VASCULAR | Facility: CLINIC | Age: 35
End: 2020-03-23

## 2020-04-09 DIAGNOSIS — Z71.89 OTHER SPECIFIED COUNSELING: ICD-10-CM
